# Patient Record
(demographics unavailable — no encounter records)

---

## 2017-03-14 NOTE — EDM.PDOC
ED HISTORY OF PRESENT ILLNESS





- General


Chief Complaint: Chest Pain


Stated Complaint: DIZZY


Time Seen by Provider: 03/14/17 07:00


Source of Information: Reports: Patient


History Limitations: Reports: No limitations





- History of Present Illness


INITIAL COMMENTS - FREE TEXT/NARRATIVE: 


History of present illness:


[] Patient awoke this morning with severe spinning sensation, nausea, sweats 

and felt like her heart was racing. She denies any chest pain, shortness of 

breath, headache or vomiting. Patient has a history of a pituitary tumor that 

was treated at her last brain imaging was 4 years ago and it was completely 

resolved.





Review of systems: 


As per history of present illness and below otherwise all systems reviewed and 

negative.





Past medical history: 


As per history of present illness and as reviewed below otherwise 

noncontributory.





Surgical history: 


As per history of present illness and as reviewed below otherwise 

noncontributory.





Social history: 


No reported history of drug or alcohol abuse.





Family history: 


As per history of present illness and as reviewed below otherwise 

noncontributory.





Physical exam:


General: Well developed, well nourished in NAD


HEENT: Atraumatic, normocephalic, pupils reactive, negative for conjunctival 

pallor or scleral icterus, mucous membranes moist, throat clear, neck supple, 

nontender, trachea midline.


Lungs: Clear to auscultation, breath sounds equal bilaterally, chest nontender.


Heart: S1S2, regular, negative for clicks, rubs, or JVD.


Abdomen: Soft, nondistended, nontender. Negative for masses or 

hepatosplenomegaly. Negative for costovertebral tenderness.


Pelvis: Stable nontender.


Genitourinary: Deferred.


Rectal: Deferred.


Extremities: Atraumatic, negative for cords or calf pain. Neurovascular 

unremarkable.


Neuro: Awake, alert, oriented. Cranial nerves II through XII unremarkable. 

Cerebellum unremarkable. Motor and sensory unremarkable throughout. Exam 

nonfocal.





Diagnostics:


[] Labs EKG chest x-ray normal





Therapeutics:


[] IV hydrated Zofran and meclizine given





Impression: 


[] Benign positional vertigo. I recommended she follow up with her doctor for a 

repeat MRI at some point to confirm pituitary tumor has not returned.





Plan:


[] Followup PMD return if symptoms worsen meclizine every 8 hours as needed for 

dizziness Zofran for nausea.





Definitive disposition and diagnosis as appropriate pending reevaluation and 

review of above.








- Related Data


Allergies/ADRs: 


 Allergies











Allergy/AdvReac Type Severity Reaction Status Date / Time


 


Penicillins Allergy  Hives Verified 03/14/17 06:47











Home Meds: 


 Home Meds





Ondansetron [Zofran ODT] 4 mg PO Q8H PRN #10 tab.dis 03/14/17 [Rx]


Venlafaxine [Effexor] 75 mg PO DAILY 03/14/17 [History]











Past Medical History


HEENT History: Reports: None


Cardiovascular History: Reports: None


Respiratory History: Reports: None


Gastrointestinal History: Reports: None


Genitourinary History: Reports: None


OB/GYN History: Reports: None


Musculoskeletal History: Reports: None


Neurological History: Reports: None


Psychiatric History: Reports: Anxiety


Endocrine/Metabolic History: Reports: None


Oncologic (Cancer) History: Reports: Other (see below)


Other Oncologic History: pituitary tumor





- Infectious Disease History


Infectious Disease History: Reports: None





Social & Family History





- Family History


Family Medical History: Noncontributory





- Tobacco Use


Smoking Status *Q: Never Smoker





- Recreational Drug Use


Recreational Drug Use: No





ED ROS GENERAL





- Review of Systems


Review Of Systems: See Below (See history of present illness)





ED EXAM, GENERAL





- Physical Exam


Exam: See Below (See history of present illness)





Course





- Vital Signs


Last Recorded V/S: 


 Last Vital Signs











Temp  36.5 C   03/14/17 06:48


 


Pulse  113 H  03/14/17 06:48


 


Resp  16   03/14/17 06:48


 


BP  147/92 H  03/14/17 06:48


 


Pulse Ox  97   03/14/17 06:48














- Orders/Labs/Meds


Orders: 


 Active Orders 24 hr











 Category Date Time Status


 


 EKG 12 Lead [EKG Documentation Completion] [RC] STAT Care  03/14/17 06:53 

Active


 


 Chest 1V Frontal [CR] Stat Exams  03/14/17 06:53 Taken











Labs: 


 Laboratory Tests











  03/14/17 03/14/17 03/14/17 Range/Units





  06:48 06:48 06:48 


 


WBC  4.76    (4.0-11.0)  K/uL


 


RBC  4.45    (4.30-5.90)  M/uL


 


Hgb  13.5    (12.0-16.0)  g/dL


 


Hct  40.0    (36.0-46.0)  %


 


MCV  89.9    (80.0-98.0)  fL


 


MCH  30.3    (27.0-32.0)  pg


 


MCHC  33.8    (31.0-37.0)  g/dL


 


RDW Std Deviation  39.9    (28.0-62.0)  fl


 


RDW Coeff of Iveth  12    (11.0-15.0)  %


 


Plt Count  298    (150-400)  K/uL


 


MPV  9.90    (7.40-12.00)  fL


 


Neut % (Auto)  48.6    (48.0-80.0)  %


 


Lymph % (Auto)  38.2    (16.0-40.0)  %


 


Mono % (Auto)  7.8    (0.0-15.0)  %


 


Eos % (Auto)  4.6    (0.0-7.0)  %


 


Baso % (Auto)  0.8    (0.0-1.5)  %


 


Neut #  2.3    (1.4-5.7)  K/uL


 


Lymph #  1.8    (0.6-2.4)  K/uL


 


Mono #  0.4    (0.0-0.8)  K/uL


 


Eos #  0.2    (0.0-0.7)  K/uL


 


Baso #  0.0    (0.0-0.1)  K/uL


 


Nucleated RBC %  0.0    /100WBC


 


Nucleated RBCs #  0    K/uL


 


Sodium   141   (136-146)  mmol/L


 


Potassium   3.5   (3.5-5.1)  mmol/L


 


Chloride   109   ()  mmol/L


 


Carbon Dioxide   20 L   (21-31)  mmol/L


 


BUN   12   (6.0-23.0)  mg/dL


 


Creatinine   0.8   (0.6-1.5)  mg/dL


 


Est Cr Clr Drug Dosing   62.44   mL/min


 


Estimated GFR (MDRD)   > 60.0   ml/min


 


Glucose   124 H   ()  mg/dL


 


Calcium   9.5   (8.8-10.8)  mg/dL


 


Total Bilirubin   0.6   (0.1-1.5)  mg/dL


 


AST   15   (5-40)  IU/L


 


ALT   23   (8-54)  IU/L


 


Alkaline Phosphatase   88   ()  


 


Troponin I    < 0.10  (0.0-0.29)  NG/ML


 


Total Protein   7.4   (6.0-8.0)  g/dL


 


Albumin   4.2   (3.5-5.0)  g/dL


 


Globulin   3.2   (2.0-3.5)  g/dL


 


Albumin/Globulin Ratio   1.3   (1.3-2.8)  


 


TSH 3rd Generation     (0.47-5.0)  uIU/mL














  03/14/17 Range/Units





  06:48 


 


WBC   (4.0-11.0)  K/uL


 


RBC   (4.30-5.90)  M/uL


 


Hgb   (12.0-16.0)  g/dL


 


Hct   (36.0-46.0)  %


 


MCV   (80.0-98.0)  fL


 


MCH   (27.0-32.0)  pg


 


MCHC   (31.0-37.0)  g/dL


 


RDW Std Deviation   (28.0-62.0)  fl


 


RDW Coeff of Iveth   (11.0-15.0)  %


 


Plt Count   (150-400)  K/uL


 


MPV   (7.40-12.00)  fL


 


Neut % (Auto)   (48.0-80.0)  %


 


Lymph % (Auto)   (16.0-40.0)  %


 


Mono % (Auto)   (0.0-15.0)  %


 


Eos % (Auto)   (0.0-7.0)  %


 


Baso % (Auto)   (0.0-1.5)  %


 


Neut #   (1.4-5.7)  K/uL


 


Lymph #   (0.6-2.4)  K/uL


 


Mono #   (0.0-0.8)  K/uL


 


Eos #   (0.0-0.7)  K/uL


 


Baso #   (0.0-0.1)  K/uL


 


Nucleated RBC %   /100WBC


 


Nucleated RBCs #   K/uL


 


Sodium   (136-146)  mmol/L


 


Potassium   (3.5-5.1)  mmol/L


 


Chloride   ()  mmol/L


 


Carbon Dioxide   (21-31)  mmol/L


 


BUN   (6.0-23.0)  mg/dL


 


Creatinine   (0.6-1.5)  mg/dL


 


Est Cr Clr Drug Dosing   mL/min


 


Estimated GFR (MDRD)   ml/min


 


Glucose   ()  mg/dL


 


Calcium   (8.8-10.8)  mg/dL


 


Total Bilirubin   (0.1-1.5)  mg/dL


 


AST   (5-40)  IU/L


 


ALT   (8-54)  IU/L


 


Alkaline Phosphatase   ()  


 


Troponin I   (0.0-0.29)  NG/ML


 


Total Protein   (6.0-8.0)  g/dL


 


Albumin   (3.5-5.0)  g/dL


 


Globulin   (2.0-3.5)  g/dL


 


Albumin/Globulin Ratio   (1.3-2.8)  


 


TSH 3rd Generation  0.75  (0.47-5.0)  uIU/mL











Meds: 


Medications














Discontinued Medications














Generic Name Dose Route Start Last Admin





  Trade Name Freq  PRN Reason Stop Dose Admin


 


Sodium Chloride  1,000 mls @ 999 mls/hr  03/14/17 07:11  03/14/17 07:37





  Normal Saline  IV  03/14/17 08:11  999 mls/hr





  .Bolus ONE   Administration


 


Meclizine HCl  25 mg  03/14/17 07:35  03/14/17 07:38





  Antivert  PO  03/14/17 07:36  25 mg





  ONETIME ONE   Administration


 


Ondansetron HCl  4 mg  03/14/17 07:11  03/14/17 07:36





  Zofran  IVPUSH  03/14/17 07:12  4 mg





  ONETIME ONE   Administration














Departure





- Departure


Time of Disposition: 08:39


Disposition: Home, Self-Care 01


Condition: good


Clinical Impression: 


Benign positional vertigo


Qualifiers:


 Laterality: unspecified laterality Qualified Code(s): H81.10 - Benign 

paroxysmal vertigo, unspecified ear





Prescriptions: 


Ondansetron [Zofran ODT] 4 mg PO Q8H PRN #10 tab.dis


 PRN Reason: Nausea


Forms:  ED Department Discharge


Additional Instructions: 


The following information is given to patients seen in the emergency department 

who are being discharged to home. This information is to outline your options 

for follow-up care. We provide all patients seen in our emergency department 

with a follow-up referral.





The need for follow-up, as well as the timing and circumstances, are variable 

depending upon the specifics of your emergency department visit.





If you don't have a primary care physician on staff, we will provide you with a 

referral. We always advise you to contact your personal physician following an 

emergency department visit to inform them of the circumstance of the visit and 

for follow-up with them and/or the need for any referrals to a consulting 

specialist.





The emergency department will also refer you to a specialist when appropriate. 

This referral assures that you have the opportunity for follow-up care with a 

specialist. All of these measure are taken in an effort to provide you with 

optimal care, which includes your follow-up.





Under all circumstances we always encourage you to contact your private 

physician who remains a resource for coordinating your care. When calling for 

follow-up care, please make the office aware that this follow-up is from your 

recent emergency room visit. If for any reason you are refused follow-up, 

please contact the [facility] Emergency 

Department at [phone] and asked to speak to the emergency department 

charge nurse.





Zofran for nausea, meclizine every 8 hours as needed for dizziness





[facility]


Primary Care


[address] [place] [id number]


Phone: [phone]


Fax: [phone]

## 2017-03-16 NOTE — CR
EXAM DATE: 17



PATIENT'S AGE: 47



Patient: ISABEL MORAN



Facility: [place], [place]

Patient ID: [id number]

Site Patient ID: [id number].

Site Accession #: [id number].

: 1969

Study: XRay Chest [id number]/14/2017 7:04:39 AM

Ordering Physician: Doctor Guzman



Final Report: 

HISTORY:

Palpitations.



FINDINGS:

AP portable chest radiograph is compared with 2017. EKG leads 
overlie the thorax. The cardiac silhouette is normal. Pulmonary vasculature is 
free of cephalization. No lobar consolidation or pleural effusion is seen. No 
pneumothorax.



IMPRESSION:

No acute cardiopulmonary disease.



Dictated by Josefnia Sanz MD @ 3/14/2017 7:16:28 AM





Dictated by: Josefina Sanz MD @ 2017 07:16:40

(Electronic Signature)



Report Signed by Proxy and Original Signed Document filed in the

Medical Record.
MTDD

## 2017-04-19 NOTE — MR
EXAMINATION: MRI of the brain with and without contrast

 

HISTORY: There is no history of benign neoplasm

 

COMPARISON: None

 

TECHNIQUE: Multiplanar and multisequence images obtained through the head before and following the a
dministration of 7.5 mL of Gadavist. A pituitary protocol was used.

 

FINDINGS: There is no mass, mass effect, or midline shift. The ventricles and sulci are symmetric. N
o abnormal diffusion restriction is noted.  The maxillary sinuses and mastoid air cells are grossly 
clear. The orbits and globes are symmetric. No extra-axial fluid collection. The pituitary gland enh
ances homogeneously. No hypoenhancing component identified to suggest a pituitary adenoma. Infundibu
lum is midline. The optic nerves and optic chiasm appear normal. The visualized flow voids are prese
nt. The remaining midline structures appear normal.

 

IMPRESSION: 

1. No pituitary mass identified.

2. Otherwise unremarkable MRI of the brain.